# Patient Record
Sex: FEMALE | Race: WHITE | NOT HISPANIC OR LATINO | Employment: OTHER | URBAN - NONMETROPOLITAN AREA
[De-identification: names, ages, dates, MRNs, and addresses within clinical notes are randomized per-mention and may not be internally consistent; named-entity substitution may affect disease eponyms.]

---

## 2023-07-24 ENCOUNTER — TELEPHONE (OUTPATIENT)
Dept: GENETICS | Facility: HOSPITAL | Age: 78
End: 2023-07-24
Payer: MEDICARE

## 2023-08-30 ENCOUNTER — TELEPHONE (OUTPATIENT)
Dept: GENETICS | Facility: HOSPITAL | Age: 78
End: 2023-08-30
Payer: MEDICARE

## 2023-08-31 ENCOUNTER — DOCUMENTATION (OUTPATIENT)
Dept: GENETICS | Facility: HOSPITAL | Age: 78
End: 2023-08-31
Payer: MEDICARE

## 2023-08-31 ENCOUNTER — TELEPHONE (OUTPATIENT)
Dept: GENETICS | Facility: HOSPITAL | Age: 78
End: 2023-08-31
Payer: MEDICARE

## 2023-08-31 NOTE — PROGRESS NOTES
Rhona Mcfarland, a 78-year-old female, was referred for genetic counseling due to a family history of cancer. Genetic counseling was provided via telephone. Ms. Mcfarland confirmed her name, date of birth, and that she was in [place] at the time of the appointment. 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 She had four different spots that had to be removed, and she had radiation twice. She also has a history of exocrine pancreatic insufficiency. She was 14 years old at menarche and had her first child at age 20. She went through menopause at 46/47, took estrogen/progesterone for 3-4 years, and retains her uterus and ovaries. Ms. Mcfarland's most recent mammogram was within the last week, and she isn't aware of the results just yet. Ms. Mcfarland recently had a colonoscopy and reports a history of 10 or more polyps in her lifetime. She was interested in discussing her risk for a hereditary cancer syndrome. Ms. Mcfarland decided to pursue comprehensive genetic testing to evaluate her risk of cancer, therefore the CancerNext Expanded Panel was ordered through [facility] which analyzes 77 genes associated with an increased cancer risk. Genetic testing was negative for pathogenic mutations in BRCA1/2 and 75 additional genes included on this panel (see attached results). These normal results were discussed with Ms. Mcfarland by telephone on 8/31/23.     PERTINENT FAMILY HISTORY: (See attached pedigree)   Mother:   Breast cancer, 65 & 75  Sister 1:  Pancreatic cancer, 63  Sister 2:  Pancreatic cancer, 62  Mat Grandmother: Pancreatic cancer     Stomach cancer     Liver cancer  Father:   Skin cancer  Pat Half-Aunt:  Cancer, unknown    We do not have medical records regarding any of these diagnoses.       RISK ASSESSMENT:  Ms. Mcfarland's family history of breast cancer raises the question of a hereditary cancer syndrome. NCCN guidelines for genetic testing for BRCA1/2 states that individuals with a  "first-degree relative with pancreatic cancer at any age may consider genetic testing. With Ms. Mcfarland's sisters' diagnoses, she would clearly meet this criteria. This risk assessment is based on the family history information provided at the time of the appointment. The assessment could change in the future should new information be obtained.    At this time, Ms. San management should be guided by a family history-based risk assessment. [employer], version 8 is able to take into account personal factors (age at menarche, age at first live birth, breast density, etc) and family history when calculating risk for breast cancer. Computer modeling estimates that Ms. Garcias lifetime personal risk for developing breast cancer is up to 6% (Isatuer-Cierazick, v8), compared to the average female's risk of 12.5%. In general, a lifetime risk above 20% is considered to be "high risk" where increased screening is warranted; Ms. San risk does not fall into that category. This risk assessment is based on the family history information provided at the time of the appointment and could change in the future should new information be obtained.    GENETIC COUNSELING (30 minutes):  We reviewed the family history information in detail. Cases of cancer follow three general patterns: sporadic, familial, and hereditary. While most cancer is sporadic, some cases appear to occur in family clusters. These cases are said to be familial and account for 10-20% of cancer cases. Familial cases may be due to a combination of shared genes and environmental factors among family members. In even fewer cases, the risk for cancer is inherited, and the genes responsible for the increased cancer risk are known.       Family histories typical of hereditary cancer syndromes usually include multiple first- and second-degree relatives diagnosed with cancer types that define a syndrome. These cases tend to be diagnosed at younger-than-expected ages and " can be bilateral or multifocal. The cancer in these families follows an autosomal dominant inheritance pattern, which indicates the likely presence of a mutation in a cancer susceptibility gene. Children and siblings of an individual believed to carry this mutation have a 50% chance of inheriting that mutation, thereby inheriting the increased risk to develop cancer. These mutations can be passed down from the maternal or the paternal lineage.     Hereditary pancreatic cancer accounts for approximately 10% of all cases of pancreatic cancer. The gene most often associated with a hereditary risk for pancreatic cancer is BRCA2. Mutations in BRCA2 confer up to a 7% risk for pancreatic cancer. Mutations in BRCA1 and BRCA2 also confer an increased risk for breast cancer, ovarian cancer, male breast cancer, and prostate cancer. Women with a BRCA1 or BRCA2 mutation have up to an 87% risk of breast cancer and up to a 58% risk of ovarian cancer. Zaman syndrome is a hereditary colon cancer syndrome that is associated with pancreatic cancer and uterine cancer. The CDKN2A and CDK4 genes are associated with increased risks for pancreatic cancer and melanoma. We reviewed that, in some cases, the identification of a genetic mutation may impact treatment options for some types of cancer. In order to get as much information as possible regarding potential underlying causes, as well as risks for her family, Ms. Mcfarland opted to pursue testing through a panel that would look at multiple genes associated with hereditary risk for cancer.    GENETIC TESTING:  The risks, benefits, and limitations of genetic testing and implications for clinical management following testing were reviewed. DNA test results can influence decisions regarding screening and prevention. Genetic testing can have significant psychological implications for both individuals and families. Also discussed was the possibility of employment and insurance discrimination  based on genetic test results and the laws in place to prevent this, as well as the limitations of these laws.       We discussed panel testing, which would involve testing 77 genes associated with increased cancer risk. The implications of a positive or negative test result were discussed. We also discussed the importance of testing an affected relative and how a negative result for Ms. Mcfarland wouldn't necessarily mean the cancers presenting in her family weren't due to a genetic mutation that Ms. Mcfarland did not inherit. In general, a negative genetic test result is most informative if a mutation has first been established in an affected member of the family. In cases where an affected individual is not available or interested in testing, it is appropriate to offer testing to an unaffected individual. We discussed the possibility that, in some cases, genetic test results may be ambiguous due to the identification of a genetic variant. These variants may or may not be associated with an increased cancer risk. With multigene panel testing, it is not uncommon for a variant of uncertain significance (VUS) to be identified. If a VUS is identified, testing family members is typically not recommended and screening recommendations are made based on the family history. The laboratories that perform genetic testing work to reclassify the VUS and send out an amended report if and when a VUS is reclassified. The majority of variant findings are ultimately reclassified to a negative result. Given Ms. Mcfarland's family history, a negative test result does not eliminate all cancer risk, although the risk would not be as high as it would with positive genetic testing.     TEST RESULTS:  Genetic testing was negative for pathogenic mutations by sequencing and rearrangement testing for the 75 genes including BRCA1/2 on the CancerNext Expanded panel. The negative result greatly lowers the risk of a hereditary cancer syndrome for Ms.  "Bartolo. It is possible that the family history is due to a hereditary cancer syndrome which Ms. Mcfarland did not happen to inherit. This assessment is based on the information provided at the time of the consultation.    CANCER PREVENTION:  Options available to individuals with an elevated lifetime risk for breast and/or ovarian cancer were briefly discussed. Based on computer modeling, Ms. Mcfarland's lifetime risk for breast cancer would not be considered "high risk" (>20%). Per NCCN guidelines, it is appropriate for her to follow general population screening guidelines for her breast cancer risk including annual clinical breast exam and annual mammography. These assessments are based on the information provided at the time of consultation.    PLAN: Genetic counseling remains available to Ms. Mcfarland and her family. If she has any questions in the future, she is welcome to call me at [phone].      Claire Britt MS, [facility], [facility]  Licensed Certified Genetic Counselor      Cc: Rhona Johnson MD            "

## 2023-08-31 NOTE — TELEPHONE ENCOUNTER
Spoke with patient and disclosed negative genetic results. Informed patient these results would be on [facility] and sent to her Dr. Patient requested a copy be mailed to her.    Pt had questions about what was tested and voiced understanding that we only tested these 77 cancer genes, not all of her genes. Pt informed there is no indication for her son to need genetic testing for these cancer genes unless there's a family hx in his father's side. Pt understands her siblings may still want to consider genetic testing.